# Patient Record
Sex: MALE | URBAN - METROPOLITAN AREA
[De-identification: names, ages, dates, MRNs, and addresses within clinical notes are randomized per-mention and may not be internally consistent; named-entity substitution may affect disease eponyms.]

---

## 2023-07-19 ENCOUNTER — TRANSFERRED RECORDS (OUTPATIENT)
Dept: HEALTH INFORMATION MANAGEMENT | Facility: CLINIC | Age: 63
End: 2023-07-19

## 2023-08-02 DIAGNOSIS — R56.9 SEIZURE-LIKE ACTIVITY (H): Primary | ICD-10-CM

## 2023-09-12 ENCOUNTER — HOSPITAL ENCOUNTER (OUTPATIENT)
Facility: CLINIC | Age: 63
End: 2023-09-12
Attending: PSYCHIATRY & NEUROLOGY | Admitting: PSYCHIATRY & NEUROLOGY
Payer: COMMERCIAL

## 2023-11-28 ENCOUNTER — ANCILLARY PROCEDURE (OUTPATIENT)
Dept: NEUROLOGY | Facility: CLINIC | Age: 63
End: 2023-11-28
Payer: COMMERCIAL

## 2023-11-28 ENCOUNTER — OFFICE VISIT (OUTPATIENT)
Dept: NEUROLOGY | Facility: CLINIC | Age: 63
End: 2023-11-28
Payer: COMMERCIAL

## 2023-11-28 VITALS
SYSTOLIC BLOOD PRESSURE: 141 MMHG | BODY MASS INDEX: 38.67 KG/M2 | HEART RATE: 75 BPM | HEIGHT: 67 IN | TEMPERATURE: 97 F | DIASTOLIC BLOOD PRESSURE: 80 MMHG | WEIGHT: 246.4 LBS

## 2023-11-28 DIAGNOSIS — R56.9 SEIZURES (H): ICD-10-CM

## 2023-11-28 DIAGNOSIS — R56.9 SEIZURE-LIKE ACTIVITY (H): ICD-10-CM

## 2023-11-28 RX ORDER — BETAMETHASONE DIPROPIONATE 0.5 MG/G
CREAM TOPICAL
COMMUNITY
Start: 2023-05-27

## 2023-11-28 RX ORDER — ONDANSETRON 4 MG/1
TABLET, FILM COATED ORAL
COMMUNITY
Start: 2023-05-27

## 2023-11-28 RX ORDER — OXYCODONE HCL 40 MG/1
TABLET, FILM COATED, EXTENDED RELEASE ORAL
COMMUNITY
Start: 2023-09-26

## 2023-11-28 RX ORDER — DAPAGLIFLOZIN AND METFORMIN HYDROCHLORIDE 10; 1000 MG/1; MG/1
TABLET, FILM COATED, EXTENDED RELEASE ORAL
COMMUNITY
Start: 2023-08-09

## 2023-11-28 RX ORDER — ROSUVASTATIN CALCIUM 5 MG/1
TABLET, COATED ORAL
COMMUNITY
Start: 2022-08-29

## 2023-11-28 RX ORDER — TESTOSTERONE 1.62 MG/G
GEL TRANSDERMAL
COMMUNITY
Start: 2023-08-09 | End: 2024-02-05

## 2023-11-28 RX ORDER — SERTRALINE HYDROCHLORIDE 25 MG/1
TABLET, FILM COATED ORAL
COMMUNITY
Start: 2023-10-24

## 2023-11-28 RX ORDER — BLOOD SUGAR DIAGNOSTIC
STRIP MISCELLANEOUS
COMMUNITY
Start: 2023-06-02

## 2023-11-28 RX ORDER — AMLODIPINE BESYLATE 5 MG/1
TABLET ORAL
COMMUNITY
Start: 2023-08-08

## 2023-11-28 RX ORDER — METOPROLOL SUCCINATE 50 MG/1
TABLET, EXTENDED RELEASE ORAL
COMMUNITY
Start: 2022-08-26

## 2023-11-28 RX ORDER — DIAZEPAM 5 MG
TABLET ORAL
COMMUNITY
Start: 2022-03-07

## 2023-11-28 RX ORDER — PANTOPRAZOLE SODIUM 40 MG/1
TABLET, DELAYED RELEASE ORAL
COMMUNITY
Start: 2023-04-07

## 2023-11-28 RX ORDER — DULOXETIN HYDROCHLORIDE 60 MG/1
CAPSULE, DELAYED RELEASE ORAL
COMMUNITY
Start: 2023-05-27

## 2023-11-28 RX ORDER — ALBUTEROL SULFATE 90 UG/1
AEROSOL, METERED RESPIRATORY (INHALATION)
COMMUNITY
Start: 2023-05-27

## 2023-11-28 NOTE — LETTER
"2023       RE: Shivam Delgado  : 1960   MRN: [id number]        Dear Colleague,    Thank you for referring your patient, Shivam Delgado, to the [facility] EPILEPSY CARE at [facility]. Please see a copy of my visit note below.    [facility] Epilepsy Care History and Physical       Patient:  Shivam Delgado  :  1960   Age:  63 year old   Today's Office Visit:  2023    Referring Provider:    ALAYNA BRENNER  [address]  [place] [address]    History of Present Illness:  Shivam Delgado is 63 year old right handed who presents with seizure like spells. Accompanied with wife Itz. He has a history of postural hypotension and syncope.   First seizure was March 15, 2023, per wife he had 4-5 seizure the following 6 weeks. They started seeing Dr. Caldwell and then Dr. Pabon. 2023 he had seizure in the office. He started levetiracetam in 2023, however, this worsened seizure like spells every other day. Since 2023 he had more persistent spells. He sleeps 16-18 hours per day, he states \"I do not feel like doing things during the day\", he does not enjoy normal activities.   Spell type 1: no aura prior to spell. He collapses onto the floor, back is arching, eyes are open, whole body shaking, tremor, lasts 20-30 seconds, he has a headache and fatigue afterward. He has loss of awareness and amnesia of spell. He has anxiety with these spells.   EMR notes by Dr. Pabon describe following:   \"Home videos:  1 - last part of an spell, patient had back arching, arms semiflexed, hands with fingers extended and , eyes partially open, some left face pulling, this was not from onset, lasted few seconds.   2- from 2023, not from onset, patient lying on the floor, eye open, both eyes mildly deviated to the right, arms to the sides semiflexed at the elbows, left hand fingers displayed out, right hand hand grabbed " "something close to him. \"  Epilepsy Risk Factors:  Patient has no history of encephalitis/meningitis, no history of stroke, no history of tumor, he has hit his head with falls and hit his head. The patient had a normal birth and delivery.  No developmental delays noted.  No febrile seizures.  No family members with epilepsy.     Precipitating factors:   Stress  Current Outpatient Medications   Medication Sig Dispense Refill    albuterol (PROAIR HFA/PROVENTIL HFA/VENTOLIN HFA) 108 (90 Base) MCG/ACT inhaler Inhale 2 puffs orally Every 4 hours as needed for shortness of breath Shake well before using.      amLODIPine (NORVASC) 5 MG tablet Take 1 tablet (5 mg) by mouth 1 time per day      betamethasone dipropionate (DIPROSONE) 0.05 % external cream Apply to affected area 2 times a day as needed for itching      blood glucose (ONETOUCH VERIO IQ) test strip USE TO TEST BLOOD SUGAR ONCE DAILY      Dapagliflozin Pro-metFORMIN ER (XIGDUO XR)  MG TB24 Take 1 tablet by mouth 1 time a day in the morning Take with food. Swallow tablet(s) whole, do not crush, cut, or chew.      diazepam (VALIUM) 5 MG tablet TAKE 1 TABLET(5 MG) BY MOUTH EVERY NIGHT AT BEDTIME      DULoxetine (CYMBALTA) 60 MG capsule Take 1 capsule (60 mg) by mouth 1 time per day      metoprolol succinate ER (TOPROL XL) 50 MG 24 hr tablet TAKE 1/2 1 TABLET BY MOUTH ONE TIME PER DAY      naloxegol (MOVANTIK) 25 MG TABS tablet Take 25 mg by mouth 1 time a day as needed      ondansetron (ZOFRAN) 4 MG tablet Take 1-2 tablets every 6 hours as needed for nausea/vomiting      oxyCODONE (OXYCONTIN) 40 MG 12 hr tablet Take 1 tablet (40 mg) by mouth as needed for moderate pain (3 times a day as needed)      pantoprazole (PROTONIX) 40 MG EC tablet TAKE 1 TABLET(40 MG) BY MOUTH EVERY DAY IN THE MORNING      rosuvastatin (CRESTOR) 5 MG tablet TAKE 1 TABLET(5 MG) BY MOUTH EVERY DAY      sertraline (ZOLOFT) 25 MG tablet Take 1 tablet (25 mg) by mouth 2 times a day      " "testosterone (ANDROGEL 1.62 % PUMP) 20.25 MG/ACT gel Apply 2 Pump topically 1 time a day in the morning Apply to clean, dry, intact skin of the shoulders and upper arms.       Perceived AED Side Effects: No  Medication Notes:   AED Medication Compliance:  compliant most of the time  Using a pill box:  Yes  Past AEDs:    Levetiracetam started 2023  Past medical history:    Cluster headache   Gout   Hyperlipidemia   Hypertension   Low back pain   Osteoporosis   Sleep apnea     Past surgical history:    Appendectomy   Knee surgery multiple   Hernia repair    Family history:   No history of epilepsy in family     Psycho-Social History: Shivam Delgado currently lives with wife. He is a  and worked in this area for 40 years.  he does have some anxiety and depressive symptoms. Does not smoke, rare alcohol use, no recreational drug use. We reviewed importance of mental and emotional wellbeing and impact on health. He is on sertraline for depression. Both parents were educator, both had drinking issues which got worse over time, he had one brother and he passed away in 2002 (hepatitis and cancer). No history of therapy. He started seeing a therapist 9/2023. He is reading a book \"how to deal with seizure by Dr. Blue\".   Driving:  Currently patient is:  Not driving.  Previous Evaluations for Epilepsy per Dr. Broderick note:    \"Work up has included:  EMG/NCS from 3/17/2023 normal study of the left leg, no evidence for generalized neuropathy.  Routine EEG on 4/5/2023: Within normal limits.  24 hours video EEG from 5/25-5/27/2023: 1 event recorded patient was stepping out of the bathroom then passed out on his back to his weight back to the bed. The camera did not capture the event. Patient woke up later calling for help. EEG no electrographic change before during or after the event.    Brain MRI with and without contrast from 3/17/2023: Incidental note of partially empty sella. I personally reviewed the " "image there is moderate amount of white matter chronic microvascular changes in the ivan and both hemispheres. There is mild diffuse atrophy no acute stroke. No microbleeds on GRE. No abnormal enhancement.    Carotid ultrasound from 5/10/2023: Bilateral internal carotid arteries with mild less than 50% stenosis. Bilateral vertebral arteries are antegrade.    Tilt test done on 4/5/2023: Negative tilt table evaluation.    Brain FDG PET ordered by Dr Caldwell and pending.  06/2023: MoCA 26/30 (lost one-point in cube copy, one-point in fluency, one-point in delayed recall and 1 point in date)\"   Exam:    BP (!) 141/80   Pulse 75   Temp 97  F (36.1  C) (Temporal)   Ht 5' 7\" (170.2 cm)   Wt 246 lb 6.4 oz (111.8 kg)   BMI 38.59 kg/m       Wt Readings from Last 5 Encounters:   11/28/23 246 lb 6.4 oz (111.8 kg)       Alert, orientated, speech is fluent, face symmetric, no pronator drip, equal  strength, normal to light touch with no sensory deficits noted, finger to nose normal, right leg is weaker (4+/5) than left leg (5/5), he has knee pain on left side. no focal deficits noted. Gait is stable. Able to tandem gait.       Impression:   Seizure like spell   Excessive daytime fatigue   Cluster Headaches     Discussion:  Mr. Delgado is a right handed male who presents with seizure like spells, he has increased stress at work and unresponsive to levetiracetam. He stopped levetiracetam 6/2023. His spells worsened with levetiracetam. Clinical description of spells are consistent with non epileptic spell. However, there is no well documented Video EEG. It will help to capture target spells and education patient and family about spells. Characterization of all spells defined above will help clarify diagnosis and aide in education. I suspect these are non epileptic spell.   I did review my impression with him in a supportive manner and he expressed understanding of this.  He is currently reading Cece nonepileptic block " and working with therapist.  I encouraged him to continue working with therapist and consider joining group therapy with adult children of alcoholics which is his 12-step program.  He said he will look into this.  I encouraged him to talk to his therapist about these recommendations also.    Excessive daytime fatigue may be multifactorial levetiracetam, possible sleep disorder, and depressive symptoms. He had CPAP tested for leak. He is on testosterone gel, he will benefit form re-evaluation of fatigue with primary care provider. Labs to check are TSH, vitamin B12, vitamin D.      Plan :        Continue levetiracetam 1000 mg twice a day   Admit to hospital to characterize spell.   No driving   Cancel neuropsychology testing, he had one 6/2023    Follow up with Dr. Chu   After hospital admission    The patient received guidance on maintaining appropriate seizure precautions. [place]'s driving regulations were discussed, ensuring the patient's understanding of the following restrictions: They are prohibited from operating a motor vehicle within 3 months following any seizure or episode involving sudden unconsciousness or an inability to sit up. Furthermore, they are required to report their most recent seizure to the DMV within 30 days after the event.  The patient was also advised to steer clear of activities that could potentially result in self-injury or harm to others within 3 months following any seizure with impaired awareness or impaired motor control. These activities encompass, but are not limited to, operating power tools, handling firearms, climbing ladders, trees, or engaging in activities at heights that pose a fall risk. They should refrain from operating power tools or heavy machinery and equipment. Additionally, the patient was counseled against swimming alone, and they should not bathe in any type of tub, including bathtubs, jacuzzis, or hot tubs, unless there is a responsible adult present nearby  to provide assistance in the event of a seizure to prevent drowning. Lastly, they were advised not to work with or near hot surfaces such as stoves, ovens, or scalding hot water to minimize risks associated with impaired awareness or motor control during seizures.    I spent 71 minutes in total today to provide comprehensive  medical care.   I spent 7 minutes writing the note and placing orders.   I spent 3 minutes  reviewing the chart.     The rest of the time was spent with the patient in face to face interview. During this time key medical decisions were made with review of medical chart prior to visit, visit with patient, counseling/education, and post visit work, including documentation of note on the day of visit. I addressed all questions the patient/caregiver raised in regards to epilepsy or related medical questions.             Again, thank you for allowing me to participate in the care of your patient.      Sincerely,    Gena Chu MD

## 2023-11-28 NOTE — PATIENT INSTRUCTIONS
Hospital Admission Information    [facility] nurse will review the hospital admission process and what to expect during your inpatient hospital stay. We understand that this can be an unfamiliar and possibly stressful experience. Family members, friends, or people in your support system may visit during your hospital stay, providing additional comfort and support. Our goal is to address any questions or concerns you may have and to make this process as straightforward as possible for you.    Inpatient Video EEG Admission:  You have been scheduled for an inpatient Video EEG admission to gain a better understanding of your condition. Please plan for up to 7 days for this study. On average, patients stay for 5 to 7 days at the [facility], located at [address] [place] [address].    During Your Admission, Please Bring:    A list of all your medications or your medication pill bottles.  Sufficient quantities of each medication to last for 24 hours after your discharge, especially if you have a long commute home.  Personal items and activities to keep you entertained and comfortable during your hospital stay, such as books, DVDs, a computer, or any other items that bring you comfort.  Meeting with Our Nurses:  We strongly recommend meeting with our nurses to gain a better understanding of the hospital admission process and what to expect during your inpatient stay. This will help you prepare and feel more at ease during your hospitalization.    How to Contact [facility] Epilepsy Care:  If you have any questions or need assistance, please don't hesitate to reach out to us. You can send a [employer] message on EPIC to Dr. Chu or call the [facility] at [phone] to speak with our staff.    For Paperwork Requests:  If you require paperwork to be completed, please contact our office. Please allow 2-3 weeks for turnaround time. In case you do not receive a response  from us, kindly resend your MyChart message.    We appreciate your trust in us, and we are here to support you during your hospital admission.    Gena Chu MD

## 2023-11-28 NOTE — PROGRESS NOTES
"[facility] Epilepsy Care History and Physical       Patient:  Shivam Delgado  :  1960   Age:  63 year old   Today's Office Visit:  2023    Referring Provider:    ALAYNA BRENNER  [address] [facility] [address]  [place] [address]    History of Present Illness:  Shivam Delgado is 63 year old right handed who presents with seizure like spells. Accompanied with wife Itz. He has a history of postural hypotension and syncope.   First seizure was March 15, 2023, per wife he had 4-5 seizure the following 6 weeks. They started seeing Dr. Caldwell and then Dr. Pabon. 2023 he had seizure in the office. He started levetiracetam in 2023, however, this worsened seizure like spells every other day. Since 2023 he had more persistent spells. He sleeps 16-18 hours per day, he states \"I do not feel like doing things during the day\", he does not enjoy normal activities.   Spell type 1: no aura prior to spell. He collapses onto the floor, back is arching, eyes are open, whole body shaking, tremor, lasts 20-30 seconds, he has a headache and fatigue afterward. He has loss of awareness and amnesia of spell. He has anxiety with these spells.   EMR notes by Dr. Pabon describe following:   \"Home videos:  1 - last part of an spell, patient had back arching, arms semiflexed, hands with fingers extended and , eyes partially open, some left face pulling, this was not from onset, lasted few seconds.   2- from 2023, not from onset, patient lying on the floor, eye open, both eyes mildly deviated to the right, arms to the sides semiflexed at the elbows, left hand fingers displayed out, right hand hand grabbed something close to him. \"  Epilepsy Risk Factors:  Patient has no history of encephalitis/meningitis, no history of stroke, no history of tumor, he has hit his head with falls and hit his head. The patient had a normal birth and delivery.  No developmental delays noted.  No febrile seizures.  No " family members with epilepsy.     Precipitating factors:   Stress  Current Outpatient Medications   Medication Sig Dispense Refill    albuterol (PROAIR HFA/PROVENTIL HFA/VENTOLIN HFA) 108 (90 Base) MCG/ACT inhaler Inhale 2 puffs orally Every 4 hours as needed for shortness of breath Shake well before using.      amLODIPine (NORVASC) 5 MG tablet Take 1 tablet (5 mg) by mouth 1 time per day      betamethasone dipropionate (DIPROSONE) 0.05 % external cream Apply to affected area 2 times a day as needed for itching      blood glucose (ONETOUCH VERIO IQ) test strip USE TO TEST BLOOD SUGAR ONCE DAILY      Dapagliflozin Pro-metFORMIN ER (XIGDUO XR)  MG TB24 Take 1 tablet by mouth 1 time a day in the morning Take with food. Swallow tablet(s) whole, do not crush, cut, or chew.      diazepam (VALIUM) 5 MG tablet TAKE 1 TABLET(5 MG) BY MOUTH EVERY NIGHT AT BEDTIME      DULoxetine (CYMBALTA) 60 MG capsule Take 1 capsule (60 mg) by mouth 1 time per day      metoprolol succinate ER (TOPROL XL) 50 MG 24 hr tablet TAKE 1/2 1 TABLET BY MOUTH ONE TIME PER DAY      naloxegol (MOVANTIK) 25 MG TABS tablet Take 25 mg by mouth 1 time a day as needed      ondansetron (ZOFRAN) 4 MG tablet Take 1-2 tablets every 6 hours as needed for nausea/vomiting      oxyCODONE (OXYCONTIN) 40 MG 12 hr tablet Take 1 tablet (40 mg) by mouth as needed for moderate pain (3 times a day as needed)      pantoprazole (PROTONIX) 40 MG EC tablet TAKE 1 TABLET(40 MG) BY MOUTH EVERY DAY IN THE MORNING      rosuvastatin (CRESTOR) 5 MG tablet TAKE 1 TABLET(5 MG) BY MOUTH EVERY DAY      sertraline (ZOLOFT) 25 MG tablet Take 1 tablet (25 mg) by mouth 2 times a day      testosterone (ANDROGEL 1.62 % PUMP) 20.25 MG/ACT gel Apply 2 Pump topically 1 time a day in the morning Apply to clean, dry, intact skin of the shoulders and upper arms.       Perceived AED Side Effects: No  Medication Notes:   AED Medication Compliance:  compliant most of the time  Using a pill  "box:  Yes  Past AEDs:    Levetiracetam started 2023  Past medical history:    Cluster headache   Gout   Hyperlipidemia   Hypertension   Low back pain   Osteoporosis   Sleep apnea     Past surgical history:    Appendectomy   Knee surgery multiple   Hernia repair    Family history:   No history of epilepsy in family     Psycho-Social History: Shivam Delgado currently lives with wife. He is a  and worked in this area for 40 years.  he does have some anxiety and depressive symptoms. Does not smoke, rare alcohol use, no recreational drug use. We reviewed importance of mental and emotional wellbeing and impact on health. He is on sertraline for depression. Both parents were educator, both had drinking issues which got worse over time, he had one brother and he passed away in 2002 (hepatitis and cancer). No history of therapy. He started seeing a therapist 9/2023. He is reading a book \"how to deal with seizure by Dr. Blue\".   Driving:  Currently patient is:  Not driving.  Previous Evaluations for Epilepsy per Dr. Broderick note:    \"Work up has included:  EMG/NCS from 3/17/2023 normal study of the left leg, no evidence for generalized neuropathy.  Routine EEG on 4/5/2023: Within normal limits.  24 hours video EEG from 5/25-5/27/2023: 1 event recorded patient was stepping out of the bathroom then passed out on his back to his weight back to the bed. The camera did not capture the event. Patient woke up later calling for help. EEG no electrographic change before during or after the event.    Brain MRI with and without contrast from 3/17/2023: Incidental note of partially empty sella. I personally reviewed the image there is moderate amount of white matter chronic microvascular changes in the ivan and both hemispheres. There is mild diffuse atrophy no acute stroke. No microbleeds on GRE. No abnormal enhancement.    Carotid ultrasound from 5/10/2023: Bilateral internal carotid arteries with mild less than " "50% stenosis. Bilateral vertebral arteries are antegrade.    Tilt test done on 4/5/2023: Negative tilt table evaluation.    Brain FDG PET ordered by Dr Caldwell and pending.  06/2023: MoCA 26/30 (lost one-point in cube copy, one-point in fluency, one-point in delayed recall and 1 point in date)\"   Exam:    BP (!) 141/80   Pulse 75   Temp 97  F (36.1  C) (Temporal)   Ht 5' 7\" (170.2 cm)   Wt 246 lb 6.4 oz (111.8 kg)   BMI 38.59 kg/m       Wt Readings from Last 5 Encounters:   11/28/23 246 lb 6.4 oz (111.8 kg)       Alert, orientated, speech is fluent, face symmetric, no pronator drip, equal  strength, normal to light touch with no sensory deficits noted, finger to nose normal, right leg is weaker (4+/5) than left leg (5/5), he has knee pain on left side. no focal deficits noted. Gait is stable. Able to tandem gait.       Impression:   Seizure like spell   Excessive daytime fatigue   Cluster Headaches     Discussion:  Mr. Delgado is a right handed male who presents with seizure like spells, he has increased stress at work and unresponsive to levetiracetam. He stopped levetiracetam 6/2023. His spells worsened with levetiracetam. Clinical description of spells are consistent with non epileptic spell. However, there is no well documented Video EEG. It will help to capture target spells and education patient and family about spells. Characterization of all spells defined above will help clarify diagnosis and aide in education. I suspect these are non epileptic spell.   I did review my impression with him in a supportive manner and he expressed understanding of this.  He is currently reading Cece nonepileptic block and working with therapist.  I encouraged him to continue working with therapist and consider joining group therapy with adult children of alcoholics which is his 12-step program.  He said he will look into this.  I encouraged him to talk to his therapist about these recommendations " also.    Excessive daytime fatigue may be multifactorial levetiracetam, possible sleep disorder, and depressive symptoms. He had CPAP tested for leak. He is on testosterone gel, he will benefit form re-evaluation of fatigue with primary care provider. Labs to check are TSH, vitamin B12, vitamin D.      Plan :        Continue levetiracetam 1000 mg twice a day   Admit to hospital to characterize spell.   No driving   Cancel neuropsychology testing, he had one 6/2023    Follow up with Dr. Chu   After hospital admission    The patient received guidance on maintaining appropriate seizure precautions. [place]'s driving regulations were discussed, ensuring the patient's understanding of the following restrictions: They are prohibited from operating a motor vehicle within 3 months following any seizure or episode involving sudden unconsciousness or an inability to sit up. Furthermore, they are required to report their most recent seizure to the DMV within 30 days after the event.  The patient was also advised to steer clear of activities that could potentially result in self-injury or harm to others within 3 months following any seizure with impaired awareness or impaired motor control. These activities encompass, but are not limited to, operating power tools, handling firearms, climbing ladders, trees, or engaging in activities at heights that pose a fall risk. They should refrain from operating power tools or heavy machinery and equipment. Additionally, the patient was counseled against swimming alone, and they should not bathe in any type of tub, including bathtubs, jacuzzis, or hot tubs, unless there is a responsible adult present nearby to provide assistance in the event of a seizure to prevent drowning. Lastly, they were advised not to work with or near hot surfaces such as stoves, ovens, or scalding hot water to minimize risks associated with impaired awareness or motor control during seizures.    I spent 71  minutes in total today to provide comprehensive  medical care.   I spent 7 minutes writing the note and placing orders.   I spent 3 minutes  reviewing the chart.     The rest of the time was spent with the patient in face to face interview. During this time key medical decisions were made with review of medical chart prior to visit, visit with patient, counseling/education, and post visit work, including documentation of note on the day of visit. I addressed all questions the patient/caregiver raised in regards to epilepsy or related medical questions.       Gena Chu MD   Epilepsy Staff

## 2023-11-29 ENCOUNTER — TELEPHONE (OUTPATIENT)
Dept: NEUROLOGY | Facility: CLINIC | Age: 63
End: 2023-11-29

## 2023-11-29 NOTE — TELEPHONE ENCOUNTER
Call from patient and wife.  They are wondering if ron needs to come in to the hospital for VEEG monitoring.  He had a typical event during the VEEG yesterday, and that is what was needed to be captured for diagnosis.  They will come in if there are other medical reasons for admission, however if we have the answer they would prefer not to spend the money.    Discussed with  who reviewed the event, confirmed it was NEE, and that patient does not need admission.  Call returned to patient and wife. Inpatient staff notified

## 2023-12-05 ENCOUNTER — TELEPHONE (OUTPATIENT)
Dept: NEUROLOGY | Facility: CLINIC | Age: 63
End: 2023-12-05